# Patient Record
Sex: MALE | Race: WHITE | NOT HISPANIC OR LATINO | Employment: FULL TIME | URBAN - METROPOLITAN AREA
[De-identification: names, ages, dates, MRNs, and addresses within clinical notes are randomized per-mention and may not be internally consistent; named-entity substitution may affect disease eponyms.]

---

## 2019-05-28 ENCOUNTER — TRANSCRIBE ORDERS (OUTPATIENT)
Dept: ADMINISTRATIVE | Facility: HOSPITAL | Age: 39
End: 2019-05-28

## 2019-05-28 DIAGNOSIS — R06.83 SNORING: Primary | ICD-10-CM

## 2019-05-28 DIAGNOSIS — R06.81 APNEA: ICD-10-CM

## 2019-08-02 ENCOUNTER — OFFICE VISIT (OUTPATIENT)
Dept: SLEEP CENTER | Facility: CLINIC | Age: 39
End: 2019-08-02
Payer: COMMERCIAL

## 2019-08-02 VITALS
HEIGHT: 70 IN | HEART RATE: 74 BPM | WEIGHT: 228 LBS | DIASTOLIC BLOOD PRESSURE: 70 MMHG | SYSTOLIC BLOOD PRESSURE: 112 MMHG | BODY MASS INDEX: 32.64 KG/M2

## 2019-08-02 DIAGNOSIS — R06.83 SNORING: Primary | ICD-10-CM

## 2019-08-02 DIAGNOSIS — R06.81 APNEA: ICD-10-CM

## 2019-08-02 DIAGNOSIS — J34.2 DEVIATED NASAL SEPTUM: ICD-10-CM

## 2019-08-02 DIAGNOSIS — G47.19 EXCESSIVE DAYTIME SLEEPINESS: ICD-10-CM

## 2019-08-02 DIAGNOSIS — E66.9 OBESITY (BMI 30-39.9): ICD-10-CM

## 2019-08-02 PROCEDURE — 99244 OFF/OP CNSLTJ NEW/EST MOD 40: CPT | Performed by: INTERNAL MEDICINE

## 2019-08-02 NOTE — PROGRESS NOTES
Consultation - [id number]  [facility]  45 y o  male  MKM:4/15/0298  DCZ:[id number]    Physician Requesting Consult: Mando Gardner DO     Reason for Consult : At your kind request I saw this patient for initial sleep evaluation today  The patient is here to evaluate for suspected Obstructive Sleep Apnea  PFSH, Problem List, Medications & Allergies were reviewed in EMR  He  has a past medical history of Psychiatric disorder  He has a current medication list which includes the following prescription(s): cephalexin and lamotrigine  HPI:  He has been snoring for decades  Snoring has gotten much worse and disturbs his wife was also witnessed apneas  Aware of snoring or breathing difficulties during sleep  Snoring is somewhat improved when he uses breathe right strips  Other Complaints: none  Restless Leg Syndrome: reports no suggestive symptoms but has episodic leg cramps  Parasomnia: no features reported   Sleep Routine:   Typical Bedtime:  10:30 p m  Gets OOB:  4:45 a m  TIB:6 5 hrs  Sleep latency:< 15 minutes Sleep Interruptions:1-2 x/night because of nocturia and is able to fall back asleep  Awakens: spontaneously  Upon awakening:usually feels refreshed He estimates getting 5-7 hrs sleep  He has Excessive Daytime Sleepiness takes power naps and dozes of unintentionally when sedentary  [place] Sleepiness Scale rated at Total score: 22 /24  Habits: reports that he has never smoked  He has never used smokeless tobacco , reports that he does not drink alcohol ,  reports that he does not use drugs  ,Caffeine use:limited , Exercise routine: regular    Family History: Mother has obstructive sleep apnea  ROS: reviewed & as attached  Significant for 10 lb weight gain in the past 1 year  He has nasal stuffiness due to environmental allergies   Mood is stable and he is no longer needing Lamictal      EXAM:    Vitals /70   Pulse 74   Ht 5' 10" (1 778 m)   Wt 103 kg (228 lb)   BMI 32 71 kg/m²     General  Well groomed male; appears stated age; no apparent distress  Psychiatric   Speech:clear and coherent; Cooperative  Normal mood, affect & thought    Head   Craniofacial anatomy:slight overjet  Sinuses: non- tender  TMJ: Normal     Eyes   EOM's intact;  conjunctiva/corneas clear         Nasal Airway  is patent Septum:  Deviated, Mucous membranes:appear normal     Turbinates: normal ;  No Rhinorrhea; No PND  Oral   Airway  crowded Tongue:  ; Modified Mallampati class III (soft and hard palate and base of uvula visible)  Hard Palate:normal ;Soft Palate:  redundant soft palate  Tonsils: no hypertrophy  Teeth: normal       Neck  appears thick; Neck Circumference: 17 "; Supple; no abnormal masses; Thyroid:normal  Trachea:central      Lymph    No Cervical or Submandibular Lymhadenopathy   Heart:   S1,S2 normal;RRR; no gallop; nomurmurs     Lungs   Respiratory Effort:normal  Air entry good bilaterally  No wheezes  No rales   Abdomen   Obese, Soft & non-tender     Extremities   No pedal edema  No clubbing or cyanosis  Skin   Skin is warm and dry; Color& Hydration good; no facial rashes or lesions    Neurologic  Alert and orientated; CNII-XII intact; Motor normal; Sensation normal    Muscskeltl    Muscle bulk, tone and power WNL Gait:normal           IMPRESSION: Primary Sleep/Secondary(to Medical or Psych conditions) & comorbidities   1  Snoring  Ambulatory referral to Sleep Medicine    Diagnostic Sleep Study    CPAP Study   2  Apnea  Ambulatory referral to Sleep Medicine    Diagnostic Sleep Study    CPAP Study   3  Excessive daytime sleepiness  Diagnostic Sleep Study    CPAP Study   4  Deviated nasal septum     5  Obesity (BMI 30-39  9)          PLAN:   1  Comprehensive counseling was provided on pathophysiology, diagnostic strategies & treatment options; effects on symptoms and comorbidities; risks of inadequate therapy; costs and insurance aspects     2  I advised on weight reduction, avoiding sleeping supine, using alcohol or sedating medications close to bed time and on safe driving practices  3  Nocturnal polysomnography is indicated and a diagnostic study will be scheduled  4  Patient is interested in the mandibular advancement device but willing to try Positive airway pressure therapy and will be scheduled for a titration study if indicated  5  Nasal symptoms may [improve] with regular nasal saline rinse followed by topical nasal steroid if necessary  I also suggested trial of a nasal cone  6  Follow-up will be scheduled after the studies to review results, further details of treatment options and to initiate/adjust therapy  Thank you for allowing me to participate in the care of this patient  I will keep you apprised of developments         Sincerely,     Authenticated electronically by Ruchi Greenfield MD   on 47/49/76   Board Certified Specialist

## 2019-08-02 NOTE — PATIENT INSTRUCTIONS
What is OLGA? Obstructive sleep apnea is a common and serious sleep disorder that causes you to stop breathing during sleep  The airway repeatedly becomes blocked, limiting the amount of air that reaches your lungs  When this happens, you may snore loudly or making choking noises as you try to breathe  Your brain and body becomes oxygen deprived and you may wake up  This may happen a few times a night, or in more severe cases, several hundred times a night  Sleep apnea can make you wake up in the morning feeling tired or unrefreshed even though you have had a full night of sleep  During the day, you may feel fatigued, have difficulty concentrating or you may even unintentionally fall asleep  This is because your body is waking up numerous times throughout the night, even though you might not be conscious of each awakening  The lack of oxygen your body receives can have negative long-term consequences for your health  This includes:  High blood pressure  Heart disease  Irregular heart rhythms  Stroke  Pre-diabetes and diabetes  Depression    Testing  An objective evaluation of your sleep may be needed before your board certified sleep physician can make a diagnosis  Options include:   In-lab overnight sleep study  This type of sleep study requires you to stay overnight at a sleep center, in a bed that may resemble a hotel room  You will sleep with sensors hooked up to various parts of your body  These sensors record your brain waves, heartbeat, breathing and movement  An overnight sleep study also provides your doctor with the most complete information about your sleep  Learn more about an overnight sleep study      Home sleep apnea test  Some patients with high risk factors for obstructive sleep apnea and no other medical disorders may be candidates for a home sleep apnea test  The testing equipment differs in that it is less complicated than what is used in an overnight sleep study   As such, does not give all the data an in-lab will and if negative, may not mean you do not have the problem  Treatment for sleep apnea  includes using a continuous positive airway pressure (CPAP) machine to keep your airway open during sleep  A mask is placed over your nose and mouth, or just your nose  The mask is hooked to the CPAP machine that blows a gentle stream of air into the mask when you breathe  This helps keep your airway open so you can breathe more regularly  Extra oxygen may be given to you through the machine  You may be given a mouth device  It looks like a mouth guard or dental retainer and stops your tongue and mouth tissues from blocking your throat while you sleep  Surgery may be needed to remove extra tissues that block your mouth, throat, or nose  Manage sleep apnea:   Do not smoke  Nicotine and other chemicals in cigarettes and cigars can cause lung damage  Ask your healthcare provider for information if you currently smoke and need help to quit  E-cigarettes or smokeless tobacco still contain nicotine  Talk to your healthcare provider before you use these products  Do not drink alcohol or take sedative medicine before you go to sleep  Alcohol and sedatives can relax the muscles and tissues around your throat  This can block the airflow to your lungs  Maintain a healthy weight  Excess tissue around your throat may restrict your breathing  Ask your healthcare provider for information if you need to lose weight  Sleep on your side or use pillows designed to prevent sleep apnea  This prevents your tongue or other tissues from blocking your throat  You can also raise the head of your bed  Driving Safety  Refrain from driving when drowsy  Follow up with your healthcare provider as directed:  Write down your questions so you remember to ask them during your visits  Go to AASM website for more information: Sleepeducation  org     What is OLGA?    Obstructive sleep apnea is a common and serious sleep disorder that causes you to stop breathing during sleep  The airway repeatedly becomes blocked, limiting the amount of air that reaches your lungs  When this happens, you may snore loudly or making choking noises as you try to breathe  Your brain and body becomes oxygen deprived and you may wake up  This may happen a few times a night, or in more severe cases, several hundred times a night  Sleep apnea can make you wake up in the morning feeling tired or unrefreshed even though you have had a full night of sleep  During the day, you may feel fatigued, have difficulty concentrating or you may even unintentionally fall asleep  This is because your body is waking up numerous times throughout the night, even though you might not be conscious of each awakening  The lack of oxygen your body receives can have negative long-term consequences for your health  This includes:  High blood pressure  Heart disease  Irregular heart rhythms  Stroke  Pre-diabetes and diabetes  Depression    Testing  An objective evaluation of your sleep may be needed before your board certified sleep physician can make a diagnosis  Options include:   In-lab overnight sleep study  This type of sleep study requires you to stay overnight at a sleep center, in a bed that may resemble a hotel room  You will sleep with sensors hooked up to various parts of your body  These sensors record your brain waves, heartbeat, breathing and movement  An overnight sleep study also provides your doctor with the most complete information about your sleep  Learn more about an overnight sleep study      Home sleep apnea test  Some patients with high risk factors for obstructive sleep apnea and no other medical disorders may be candidates for a home sleep apnea test  The testing equipment differs in that it is less complicated than what is used in an overnight sleep study   As such, does not give all the data an in-lab will and if negative, may not mean you do not have the problem  Treatment for sleep apnea  includes using a continuous positive airway pressure (CPAP) machine to keep your airway open during sleep  A mask is placed over your nose and mouth, or just your nose  The mask is hooked to the CPAP machine that blows a gentle stream of air into the mask when you breathe  This helps keep your airway open so you can breathe more regularly  Extra oxygen may be given to you through the machine  You may be given a mouth device  It looks like a mouth guard or dental retainer and stops your tongue and mouth tissues from blocking your throat while you sleep  Surgery may be needed to remove extra tissues that block your mouth, throat, or nose  Manage sleep apnea:   Do not smoke  Nicotine and other chemicals in cigarettes and cigars can cause lung damage  Ask your healthcare provider for information if you currently smoke and need help to quit  E-cigarettes or smokeless tobacco still contain nicotine  Talk to your healthcare provider before you use these products  Do not drink alcohol or take sedative medicine before you go to sleep  Alcohol and sedatives can relax the muscles and tissues around your throat  This can block the airflow to your lungs  Maintain a healthy weight  Excess tissue around your throat may restrict your breathing  Ask your healthcare provider for information if you need to lose weight  Sleep on your side or use pillows designed to prevent sleep apnea  This prevents your tongue or other tissues from blocking your throat  You can also raise the head of your bed  Driving Safety  Refrain from driving when drowsy  Follow up with your healthcare provider as directed:  Write down your questions so you remember to ask them during your visits  Go to AAS website for more information: Sleepeducation  org

## 2019-10-16 ENCOUNTER — HOSPITAL ENCOUNTER (OUTPATIENT)
Dept: SLEEP CENTER | Facility: HOSPITAL | Age: 39
Discharge: HOME | End: 2019-10-16
Attending: INTERNAL MEDICINE
Payer: COMMERCIAL

## 2019-10-16 DIAGNOSIS — R06.81 APNEA: ICD-10-CM

## 2019-10-16 DIAGNOSIS — R06.83 SNORING: ICD-10-CM

## 2019-10-16 DIAGNOSIS — G47.19 EXCESSIVE DAYTIME SLEEPINESS: ICD-10-CM

## 2019-10-16 PROCEDURE — 95810 POLYSOM 6/> YRS 4/> PARAM: CPT

## 2019-10-17 NOTE — PROGRESS NOTES
Sleep Study Documentation    Pre-Sleep Study       Sleep testing procedure explained to patient:NO    Patient napped prior to study:NO    Caffeine:Dayshift worker after 12PM   Caffeine use:NO    Alcohol:Dayshift workers after 5PM: Alcohol use:NO    Typical day for patient:YES       Study Documentation    Sleep Study Indications: Snoring, BMI > 30, EDS, and witnessed apneas    Sleep Study: Diagnostic   Snore:Severe  Supplemental O2: no    O2 flow rate (L/min) range N/A  O2 flow rate (L/min) final N/A  Minimum SaO2 73%  Baseline SaO2 97%        EKG abnormalities: no     EEG abnormalities: no    Sleep Study Recorded < 2 hours: N/A    Sleep Study Recorded > 2 hours but incomplete study: N/A    Sleep Study Recorded 6 hours but no sleep obtained: NO    Patient classification: employed       Patient waited longer than usual to be set-up due another patient having difficulty tolerating CPAP during acclimation sessions resulting in CPAP failure  Patient kept asking for results prior to leaving the lab but he was informed that it's against our policy to give results to patients  Post-Sleep Study    Medication used at bedtime or during sleep study:NO    Patient reports time it took to fall asleep:less than 20 minutes    Patient reports waking up during study:1 to 2 times  Patient reports returning to sleep without difficulty  Patient reports sleeping 4 to 6 hours without dreaming  Patient reports sleep during study:typical    Patient rated sleepiness: Not sleepy or tired    PAP treatment:no

## 2019-10-18 ENCOUNTER — TELEPHONE (OUTPATIENT)
Dept: SLEEP CENTER | Facility: CLINIC | Age: 39
End: 2019-10-18

## 2019-10-18 NOTE — TELEPHONE ENCOUNTER
Left message for patient to call office  Sleep study reveals mild OLGA, was going to overbook on 10/29/19 in [place] for patient to discuss further with DR Rosario  Patient does have titration study scheduled for 11/6/19

## 2019-10-22 NOTE — TELEPHONE ENCOUNTER
Spoke with patient, advised above  Scheduled follow up with Dr Joelle Sears 10/29/19 at [phone] in [facility] to discuss further

## 2019-10-25 ENCOUNTER — TRANSCRIBE ORDERS (OUTPATIENT)
Dept: SLEEP CENTER | Facility: HOSPITAL | Age: 39
End: 2019-10-25

## 2019-10-25 DIAGNOSIS — R06.81 APNEA, NOT ELSEWHERE CLASSIFIED: Primary | ICD-10-CM

## 2019-10-25 DIAGNOSIS — R06.83 SNORING: ICD-10-CM

## 2019-10-25 DIAGNOSIS — E66.9 OBESITY, UNSPECIFIED: ICD-10-CM

## 2019-10-25 DIAGNOSIS — G47.19 OTHER HYPERSOMNIA: ICD-10-CM

## 2019-10-29 ENCOUNTER — OFFICE VISIT (OUTPATIENT)
Dept: SLEEP CENTER | Facility: HOSPITAL | Age: 39
End: 2019-10-29
Payer: COMMERCIAL

## 2019-10-29 VITALS
BODY MASS INDEX: 34.36 KG/M2 | SYSTOLIC BLOOD PRESSURE: 108 MMHG | HEIGHT: 70 IN | WEIGHT: 240 LBS | DIASTOLIC BLOOD PRESSURE: 70 MMHG

## 2019-10-29 DIAGNOSIS — G47.19 OTHER HYPERSOMNIA: ICD-10-CM

## 2019-10-29 DIAGNOSIS — G47.33 OBSTRUCTIVE SLEEP APNEA: Primary | ICD-10-CM

## 2019-10-29 DIAGNOSIS — J34.2 DEVIATED NASAL SEPTUM: ICD-10-CM

## 2019-10-29 DIAGNOSIS — E66.9 OBESITY, UNSPECIFIED: ICD-10-CM

## 2019-10-29 PROCEDURE — 99214 OFFICE O/P EST MOD 30 MIN: CPT | Performed by: INTERNAL MEDICINE

## 2019-10-29 NOTE — PROGRESS NOTES
Follow-up Note - Sleep Center   Jayleen Shen  44 y o  male  :1980  HOK:[id number]    I saw Yuly Bustos in sleep clinic today for his sleep complaints & comorbidities  Patient recently had a diagnostic sleep study and is here (with his wife) to review results / treatment options  The study demonstrated Mild obstructive sleep apnea: AHI is 7 7 /hour , higher while supine and considerably higher during stage REM at 18 per hour so in the dreams sleep  Intermittent snoring of loud intensity was noted  Minimum oxygen saturation 73%  and then 9 3 minute of total sleep time was spent with saturations less than 90%  Sleep latency was short at 0 1 minutes  Sleep efficiency was better than normal at 94 3%    PFSH, Problem List, Medications & Allergies were reviewed in EMR  Interval changes: none reported  He  has a past medical history of Psychiatric disorder  He has a current medication list which includes the following prescription(s): cephalexin and lamotrigine  ROS: reviewed see attached  Significant for nasal congestion and frequent cough  HPI:  He has ongoing symptoms as outlined in his initial report  Sleep Routine: No interval changes:  He is getting around 7 hours sleep and has no difficulty initiating or maintaining sleep  He awakens spontaneously feeling rested but is tired as the day progresses  He has excessive drowsiness and rated himself at Total score: 19 /24 on the [place] sleepiness scale  Habits:  reports that he has never smoked  He has never used smokeless tobacco  He reports that he does not drink alcohol or use drugs  EXAM: /70   Ht 5' 10" (1 778 m)   Wt 109 kg (240 lb)   BMI 34 44 kg/m²     Patient is well groomed; well appearing  Skin/Extrem: warm & dry; col & hydration normal; no edema  Psych: cooperativeand in no distress  Mental state appears normal   CNS: Alert, orientated, clear & coherent speech  H&N: EOMI; NC/AT:no facial pressure marks, no rashes  ENMT Mucosa appearsnormal Nasal airway:patent  Oral airway:  crowded  Resp:effort is normal CVS: RRR ABD: truncal obesity MSK:Gait normal     IMPRESSION: Primary Sleep/Secondary(to Medical or Psych conditions) & comorbidities   1  Obstructive sleep apnea  Ambulatory referral to Sleep Medicine   2  Other hypersomnia  Ambulatory referral to Sleep Medicine   3  Deviated nasal septum     4  Obesity, unspecified  Ambulatory referral to Sleep Medicine       PLAN:    1  I reviewed results of the Sleep studies with the patient  2  With respect to above conditions, I counseled on pathophysiology, diagnosis, treatment options, risks and benefits; inter-relationship and effects on symptoms and comorbidities; risks of no treatment; costs and insurance aspects  3  Patient elected positive airway pressure therapy and has been scheduled for a titration study  4  I also advised weight reduction and allowing sufficient opportunity for sleep  5  Nasal symptoms may improve with regular nasal saline rinse followed by topical nasal steroid if necessary  6  Follow-up to be scheduled in 2 months after set up, to monitor compliance, progress and to adjust therapy  Thank you for allowing me to participate in the care of this patient  I will keep you apprised of developments      Sincerely,    Authenticated electronically by Jami Christie MD on 41/93/69   Board Certified Specialist

## 2019-10-29 NOTE — PATIENT INSTRUCTIONS

## 2019-10-29 NOTE — PROGRESS NOTES
Review of Systems      Genitourinary none   Cardiology none   Gastrointestinal none   Neurology none   Constitutional none   Integumentary none   Psychiatry none   Musculoskeletal none   Pulmonary frequent cough and snoring   ENT none   Endocrine none   Hematological none

## 2019-11-04 DIAGNOSIS — G47.33 OSA (OBSTRUCTIVE SLEEP APNEA): Primary | ICD-10-CM

## 2020-02-26 ENCOUNTER — TRANSCRIBE ORDERS (OUTPATIENT)
Dept: SLEEP CENTER | Facility: CLINIC | Age: 40
End: 2020-02-26

## 2020-02-26 DIAGNOSIS — G47.33 OBSTRUCTIVE SLEEP APNEA (ADULT) (PEDIATRIC): Primary | ICD-10-CM

## 2020-03-03 ENCOUNTER — OFFICE VISIT (OUTPATIENT)
Dept: SLEEP CENTER | Facility: HOSPITAL | Age: 40
End: 2020-03-03
Payer: COMMERCIAL

## 2020-03-03 VITALS
SYSTOLIC BLOOD PRESSURE: 100 MMHG | DIASTOLIC BLOOD PRESSURE: 60 MMHG | BODY MASS INDEX: 33.79 KG/M2 | WEIGHT: 236 LBS | HEIGHT: 70 IN | HEART RATE: 73 BPM

## 2020-03-03 DIAGNOSIS — F51.12 INSUFFICIENT SLEEP SYNDROME: ICD-10-CM

## 2020-03-03 DIAGNOSIS — G47.33 OBSTRUCTIVE SLEEP APNEA (ADULT) (PEDIATRIC): Primary | ICD-10-CM

## 2020-03-03 DIAGNOSIS — G47.19 OTHER HYPERSOMNIA: ICD-10-CM

## 2020-03-03 DIAGNOSIS — R68.2 DRY MOUTH: ICD-10-CM

## 2020-03-03 DIAGNOSIS — J34.2 DEVIATED NASAL SEPTUM: ICD-10-CM

## 2020-03-03 PROCEDURE — 99214 OFFICE O/P EST MOD 30 MIN: CPT | Performed by: INTERNAL MEDICINE

## 2020-03-03 NOTE — PROGRESS NOTES
Review of Systems      Genitourinary none   Cardiology none   Gastrointestinal none   Neurology none   Constitutional none   Integumentary none   Psychiatry none   Musculoskeletal none   Pulmonary wheezing and snoring   ENT none   Endocrine none   Hematological none

## 2020-03-03 NOTE — PATIENT INSTRUCTIONS

## 2020-03-03 NOTE — PROGRESS NOTES
Follow-Up Note - Sleep Center   Reno Mcmahon  44 y o  male  :1980  UIL:[id number]    CC: I saw this patient for follow-up in clinic today for his Sleep Disordered Breathing, Coexisting Sleep and Medical Problems  A diagnostic study 2019 demonstrated Mild obstructive sleep apnea: AHI is 7 7 /hour , higher while supine and considerably higher during stage REM at 18 per hour so in the dreams sleep  Intermittent snoring of loud intensity was noted  Minimum oxygen saturation 73% and then 9 3 minute of total sleep time was spent with saturations less than 90%  Sleep latency was short at 0 1 minutes  Sleep efficiency was better than normal at 94 3%  Auto titrating Pap was initiated  PFSH, Problem List, Medications & Allergies were reviewed in EMR  Interval changes: none reported  He  has a past medical history of Psychiatric disorder  He has a current medication list which includes the following prescription(s): cephalexin and lamotrigine  ROS: A 10 point review of systems undertaken (as attached)  He reported no nasal, respiratory or cardiac symptoms  DATA REVIEWED:  using PAP > 4 hours/night 100% of the time  Estimated BRITNI 8 6/hour at pressure of 11 2cm H2O @90th percentile  SUBJECTIVE: Regarding use of PAP, [employer] reports:   · He is experiencing some adverse effects: dry mouth/throat and mask leaks   · He is unsure whether benefiting from use:    Sleep Routine: He reports getting 6-7 hrs sleep  ; he has no difficulty initiating or maintaining sleep   He awakens spontaneously and is not always refreshed  He somewhat improved excessive drowsiness but would still nap given the opportunity  He rated himhimelf at Total score: 15 /24 on the [place] sleepiness scale  Habits: reports that he has never smoked  He has never used smokeless tobacco ], ,  reports that he does not use drugs  , Caffeine use: very little , Execise routine: none but is pyically active in his job  OBJECTIVE: /60   Pulse 73   Ht 5' 10" (1 778 m)   Wt 107 kg (236 lb)   BMI 33 86 kg/m²    Constitutional: Patient is well groomed; well appearing  Skin/Extrem: warm & dry; col & hydration normal; no edema  Psych: cooperaivean i n distress     CNS: Alert, orientated, clear& coherent speech  H&N: EOMI; NC/AT:no facial pressure marks, no rashes  ENMT Mucus membranes nomal]  Nasal airway:paten  Oral airway: crowded  Resp:efort i normal CVS: RR ABD:[runcal obesity MSK:Gai normal     ASSESSMENT: Primary Slee/Secondary(to Mdical or Pych coditions) & comorbidities   1  Obstructive sleep apnea (adult) (pediatric)  Ambulatory referral to Sleep Medicine    PAP DME Pressure Change    PAP DME Resupply/Reorder   2  Other hypersomnia     3  Insufficient sleep syndrome     4  Dry mouth     5  Deviated nasal septum       PLAN:  1  Treatment with  PAP is medically necessary and Ramond Model is agreable to continue use  2  Care of equipment, methods to improve comfort using PAP and importance of compliance with therapy were discussed  3  Pressure setting: change 8-12 cmH2O     4  Rx provided to replace supplies and Care coordinated with DME provider  5  Strategies for weight reduction were discussed  6  Folow-upi advised in 2 months to monitor progress, compliance and to adjust therapy  If daytime drowsiness persists in spite of the above strategies, he may warrant further evaluation  Thank you for allowing me to participate in the care of this patient      Sincerely,    Authenticated electronically by Elan Wallace MD on 96/10/54  Board Certified Specialist

## 2020-03-04 ENCOUNTER — TELEPHONE (OUTPATIENT)
Dept: SLEEP CENTER | Facility: CLINIC | Age: 40
End: 2020-03-04

## 2020-03-04 NOTE — TELEPHONE ENCOUNTER
Received a pressure change  Changed accordingly  Patient's now set to an APAP 8-12cm  Called patient and left a message

## 2020-03-04 NOTE — TELEPHONE ENCOUNTER
Resupply order faxed to [facility]  Pressure change order given to [facility] liaison

## 2020-05-29 ENCOUNTER — TRANSCRIBE ORDERS (OUTPATIENT)
Dept: SLEEP CENTER | Facility: HOSPITAL | Age: 40
End: 2020-05-29

## 2020-05-29 DIAGNOSIS — F51.12 INSUFFICIENT SLEEP SYNDROME: ICD-10-CM

## 2020-05-29 DIAGNOSIS — G47.33 OBSTRUCTIVE SLEEP APNEA (ADULT) (PEDIATRIC): Primary | ICD-10-CM

## 2020-05-29 DIAGNOSIS — G47.19 OTHER HYPERSOMNIA: ICD-10-CM

## 2020-06-02 ENCOUNTER — OFFICE VISIT (OUTPATIENT)
Dept: SLEEP CENTER | Facility: HOSPITAL | Age: 40
End: 2020-06-02
Payer: COMMERCIAL

## 2020-06-02 VITALS
DIASTOLIC BLOOD PRESSURE: 58 MMHG | HEIGHT: 70 IN | HEART RATE: 56 BPM | BODY MASS INDEX: 31.5 KG/M2 | WEIGHT: 220 LBS | SYSTOLIC BLOOD PRESSURE: 108 MMHG

## 2020-06-02 DIAGNOSIS — J34.2 DEVIATED NASAL SEPTUM: ICD-10-CM

## 2020-06-02 DIAGNOSIS — R68.2 DRY MOUTH: ICD-10-CM

## 2020-06-02 DIAGNOSIS — E66.9 OBESITY (BMI 30-39.9): ICD-10-CM

## 2020-06-02 DIAGNOSIS — G47.33 OBSTRUCTIVE SLEEP APNEA (ADULT) (PEDIATRIC): Primary | ICD-10-CM

## 2020-06-02 DIAGNOSIS — G47.19 OTHER HYPERSOMNIA: ICD-10-CM

## 2020-06-02 DIAGNOSIS — F51.12 INSUFFICIENT SLEEP SYNDROME: ICD-10-CM

## 2020-06-02 PROCEDURE — 99214 OFFICE O/P EST MOD 30 MIN: CPT | Performed by: INTERNAL MEDICINE

## 2020-06-03 ENCOUNTER — TELEPHONE (OUTPATIENT)
Dept: SLEEP CENTER | Facility: CLINIC | Age: 40
End: 2020-06-03

## 2021-06-02 ENCOUNTER — TRANSCRIBE ORDERS (OUTPATIENT)
Dept: SLEEP CENTER | Facility: HOSPITAL | Age: 41
End: 2021-06-02

## 2021-06-02 DIAGNOSIS — G47.33 OBSTRUCTIVE SLEEP APNEA (ADULT) (PEDIATRIC): Primary | ICD-10-CM

## 2021-06-02 DIAGNOSIS — E66.9 OBESITY, UNSPECIFIED: ICD-10-CM

## 2021-06-02 DIAGNOSIS — G47.19 OTHER HYPERSOMNIA: ICD-10-CM

## 2021-06-02 DIAGNOSIS — F51.12 INSUFFICIENT SLEEP SYNDROME: ICD-10-CM

## 2021-06-08 ENCOUNTER — OFFICE VISIT (OUTPATIENT)
Dept: SLEEP CENTER | Facility: HOSPITAL | Age: 41
End: 2021-06-08
Payer: COMMERCIAL

## 2021-06-08 VITALS
BODY MASS INDEX: 31.5 KG/M2 | HEIGHT: 70 IN | SYSTOLIC BLOOD PRESSURE: 120 MMHG | WEIGHT: 220 LBS | DIASTOLIC BLOOD PRESSURE: 80 MMHG

## 2021-06-08 DIAGNOSIS — J34.2 DEVIATED NASAL SEPTUM: ICD-10-CM

## 2021-06-08 DIAGNOSIS — E66.9 OBESITY (BMI 30-39.9): ICD-10-CM

## 2021-06-08 DIAGNOSIS — G47.33 OBSTRUCTIVE SLEEP APNEA (ADULT) (PEDIATRIC): Primary | ICD-10-CM

## 2021-06-08 DIAGNOSIS — R68.2 DRY MOUTH: ICD-10-CM

## 2021-06-08 DIAGNOSIS — F32.A DEPRESSION, UNSPECIFIED DEPRESSION TYPE: ICD-10-CM

## 2021-06-08 DIAGNOSIS — G47.19 OTHER HYPERSOMNIA: ICD-10-CM

## 2021-06-08 PROCEDURE — 99214 OFFICE O/P EST MOD 30 MIN: CPT | Performed by: INTERNAL MEDICINE

## 2021-06-08 RX ORDER — ARIPIPRAZOLE 2 MG/1
TABLET ORAL
COMMUNITY
Start: 2021-05-27

## 2021-06-08 RX ORDER — VENLAFAXINE HYDROCHLORIDE 150 MG/1
CAPSULE, EXTENDED RELEASE ORAL
COMMUNITY
Start: 2021-05-30

## 2021-06-08 NOTE — PROGRESS NOTES
Review of Systems      Genitourinary none   Cardiology none   Gastrointestinal none   Neurology difficulty with memory   Constitutional none   Integumentary none   Psychiatry none   Musculoskeletal none   Pulmonary none   ENT none   Endocrine none   Hematological none

## 2021-06-08 NOTE — PATIENT INSTRUCTIONS

## 2021-06-08 NOTE — PROGRESS NOTES
Follow-Up Note - Sleep Center   Ashley Salazar  36 y o  male  :1980  UUB:[id number]    CC: I saw this patient for follow-up in clinic today for Sleep disordered breathing, Coexisting Sleep and Medical Problems  A diagnostic study 2019 demonstrated Mild obstructive sleep apnea: AHI is 7 7 /hour , higher while supine and considerably higher during stage REM at 18 per hour so in the dreams sleep  Intermittent snoring of loud intensity was noted  Minimum oxygen saturation 73% and then 9 3 minute of total sleep time was spent with saturations less than 90%  Sleep latency was short at 0 1 minutes  Sleep efficiency was better than normal at 94 3%  Auto titrating Pap was initiated  PFSH, Problem List, Medications & Allergies were reviewed in EMR  Interval changes: none reported  He  has a past medical history of Psychiatric disorder  He has a current medication list which includes the following prescription(s): aripiprazole, venlafaxine, cephalexin, and lamotrigine  PHYSIOLOGICAL DATA REVIEW AND INTERPRETATION:   using PAP > 4 hours/night 60%  Estimated BRITNI 2 5/hour with pressure of 11cm H2O @90th percentile  Compliance: fair; Sleep disordered breathing:stable & within target range    SUBJECTIVE: Regarding use of PAP, Leanne Keating reports:   · He is experiencing no  adverse effects:  Dry mouth has improved with use of humidity  · He is benefiting from use: Unsure   Sleep Routine: Leanne Keating reports getting 7-8 hrs sleep  ; he has no difficulty initiating or maintaining sleep   He arises spontaneously and is not always refreshed  Leanne Keating reports Excessive Daytime Sleepiness  he  feels like napping but does not have the opportunity and rated himself at Total score: 15 /24 on the Cleves Sleepiness Scale  Habits: reports that he has never smoked   He has never used smokeless tobacco ,  reports no history of alcohol use ,  reports no history of drug use , Caffeine use: very little , Exercise routine: regular is physically active in his job  ROS: as attached  Significant for weight has been stable  Nasal symptoms are controlled  He reported no respiratory or cardiac symptoms  He was recently diagnosed with depression and was started on Effexor and Abilify that he takes in the morning  EXAM: /80   Ht 5' 10" (1 778 m)   Wt 99 8 kg (220 lb)   BMI 31 57 kg/m²     Patient is well groomed; well appearing  H&N: EOMI; NC/AT:no facial pressure marks, no rashes  Skin/Extrem: col & hydration normal; no edema  Psych: cooperativeand in no distress  Mental state:  Has a constricted affect  Resp: Respiratory effort is normal  CNS: Alert, orientated, clear & coherent speech  Physical findings otherwise essentially unchanged from previous  IMPRESSION: Problem List Items & Comorbidities Addressed this Visit    1  Obstructive sleep apnea (adult) (pediatric)  Ambulatory referral to Sleep Medicine    PAP DME Resupply/Reorder   2  Other hypersomnia  Ambulatory referral to Sleep Medicine   3  Deviated nasal septum     4  Depression, unspecified depression type     5  Dry mouth     6  Obesity (BMI 30-39  9)         PLAN:  1  I reviewed results of prior studies and physiologic data with the patient  I discussed treatment options with risks and benefits  2  Treatment with  PAP is medically necessary and Tosha Devries is agreable to continue use  3  Care of equipment, methods to improve comfort using PAP and importance of compliance with therapy were discussed  4  Pressure setting: continue 8-12 cmH2O     5  Rx provided to replace  supplies and Care coordinated with DME provider  6  I advise taking Abilify q h s  And if symptoms persist to also take effect so at night  He is under care of Psychiatry and may need reviewing of his medications  7  Discussed strategies for weight reduction  8  Follow-up is advised in 1 year or sooner if needed to monitor progress, compliance and to adjust therapy  Thank you for allowing me to participate in the care of this patient      Sincerely,    Authenticated electronically by Bryon West MD on 33/77/78   Board Certified Specialist

## 2021-06-09 ENCOUNTER — TELEPHONE (OUTPATIENT)
Dept: SLEEP CENTER | Facility: CLINIC | Age: 41
End: 2021-06-09

## 2022-06-21 ENCOUNTER — OFFICE VISIT (OUTPATIENT)
Dept: SLEEP CENTER | Facility: HOSPITAL | Age: 42
End: 2022-06-21
Payer: COMMERCIAL

## 2022-06-21 VITALS
OXYGEN SATURATION: 98 % | HEART RATE: 84 BPM | BODY MASS INDEX: 35.22 KG/M2 | HEIGHT: 70 IN | WEIGHT: 246 LBS | SYSTOLIC BLOOD PRESSURE: 120 MMHG | DIASTOLIC BLOOD PRESSURE: 78 MMHG

## 2022-06-21 DIAGNOSIS — J34.2 DEVIATED NASAL SEPTUM: ICD-10-CM

## 2022-06-21 DIAGNOSIS — G47.33 OBSTRUCTIVE SLEEP APNEA (ADULT) (PEDIATRIC): Primary | ICD-10-CM

## 2022-06-21 DIAGNOSIS — F32.A DEPRESSION, UNSPECIFIED DEPRESSION TYPE: ICD-10-CM

## 2022-06-21 DIAGNOSIS — R68.2 DRY MOUTH: ICD-10-CM

## 2022-06-21 DIAGNOSIS — Z71.89 CPAP USE COUNSELING: ICD-10-CM

## 2022-06-21 DIAGNOSIS — F51.12 INSUFFICIENT SLEEP SYNDROME: ICD-10-CM

## 2022-06-21 DIAGNOSIS — E66.9 OBESITY, UNSPECIFIED: ICD-10-CM

## 2022-06-21 DIAGNOSIS — G47.19 OTHER HYPERSOMNIA: ICD-10-CM

## 2022-06-21 PROCEDURE — 99214 OFFICE O/P EST MOD 30 MIN: CPT | Performed by: INTERNAL MEDICINE

## 2022-06-21 NOTE — PATIENT INSTRUCTIONS
Continuous Positive Airway Pressure (CPAP) therapy was prescribed to you as a medical necessity and there are risks of discontinuing  use of the device, some of which may be long term  Symptoms you experienced before using CPAP may return such as snoring, apneas, excessive daytime sleepiness, hypertension, cardiac arrhythmias, risk of stroke, congestive heart-failure, exacerbation of COPD and potential respiratory failure  Ultimately, it is a personal decision for you to make if you continue use of an affected device or discontinue until a replacement is provided  Unfortunately, [employer] has provided us with limited information about available devices  However, recently some patients who registered the machines early on, already received replacement  You can visit their website at www  [email or web address]-update to register your device or www  [email or web address]  expertinquiry  com to learn more about how [employer] to replace your device  You can also try calling [phone]  Another option would be to check with your medical equipment provider to determine if you are eligible for a new machine through your insurance, if not you can pay out of pocket for a new machine  According to [facility], an in line bacterial filter is not recommended for use with CPAP/BiPAP  If you wish to get a replacement machine, we are able to provide you with a script  Please include your mask type  On 14 Jun 2021, [facility] announced a recall of most of the CPAP machines that it has made in the past decade  The recall notice stated that their concern centered on sound-deadening foam used in all the machines  What should an ordinary CPAP user do? You are being forced to make a personal decision, and you have very little hard data at your disposal   The epidemiologic studies cited above make it appear that [facility] is being overly cautious  However, you will have to live with the consequences of your decision    If you get in your car, you know that you could die in a fiery crash, or become paralyzed in a collision, but you drive on, because you understand that the odds of a bad outcome are acceptably small  Here you are being asked to make a similar decision  The chances that the foam in your CPAP machine will harm you irreparably is certainly small  Is it acceptably small? Only you can decide  The following was written by a physician CPAP user who decided for himself that there is minimal if any carcinogenic risk  On June 14 [facility] issued a recall for almost all their CPAP machines in the [address] [facility], approximately 2 million devices  For the rest of the world, they did not issue a recall, but rather a "safety advisory" for the millions of other CPAP machines  Their stated reason was the "difference in regulatory environments" between the [place] and the rest of the world, but what it really means is that they are concerned with the high risk of litigation in the [address] [facility]  This is much less of an issue in the rest of the world  Because of this concern, the information from [employer] to providers and patients is probably limited by their legal considerations  What did they find? Estephanie Emmanuel has two concerns  First, they found that, occasionally, in some unknown number of machines, the sound insulating foam that keeps the machine quiet can deteriorate  Tiny particles of foam could get into the airstream  Particles could, in some cases, irritate the airway and possibly cause coughing, wheezing and other allergic symptoms  This may be a more significant issue for patients with underlying lung disease, such as asthma or emphysema  As far as anyone knows, these foam breakdown particles are generally inert and similar to ordinary dust  [employer] has not released any data revealing long-term risks  Is this common? They haven't told us how often this occurs; whether it's 1/100 machines, 1/1,000 machines or 1/100,0000   We believe that [facility]' perspective is that the recall is necessary to protect them from litigation, even if the risk is very small (or nonexistent)  The second concern is the foam may release trace amounts of several chemicals connected to the manufacture of the foam  These chemicals are in widespread use in industry, and in fact, appear in small quantities in many consumer products, cosmetics, personal care products, and medicines  Based on studies done in the laboratory where bacteria are exposed to high concentrations of these chemicals, there is a concern that one or more may be carcinogenic at some dose  It is important to note that these screening tests do not correlate closely with human disease  At this time, there are no known human cancers associated with these substances  That does not mean they do not have the capability to contribute to cancers; we just don't know  Even if does, we don't know if it takes a month of exposure, a year, or a decade  Is it worse than smoking a pack of cigarettes for a year? Again, we just don't know  Even though the company just became aware of this issue, it has always been there, even if you've had your device for 2,3, or 4 or more years  In summary, we don't know how common foam breakdown occurs  If it does occur, we don't know how often it contributes to disease  We are not even sure that there is any meaningful risk at all  What can you do? In response to this information, patients have 4 choices, and we have had patients select each of these  We cannot give you advice, because there is not enough data to do that, but we can give you information to help you make a choice  The three choices are:    1  Keep using your CPAP until the recall is resolved, which could take up to a year, considering that the risk is unknown  Most of our patients with severe sleep apnea or who are very dependent on their machines have elected to do this   This includes most of our physician patients who are CPAP users  2  Stop using your device until the recall has been resolved  Some patients, especially those with mild to moderate sleep apnea, who may feel minimally different without their CPAPs have elected to stop using CPAP until the recall is resolved  Remember that most people have had symptoms of sleep apnea for years before the diagnosis was made  3  Purchase another brand of machine (such as Resmed) on your own with your own money  They are probably less expensive on the internet (about $900) than from your current equipment provider  If you decide on this, we can provide orders for you to use to purchase one  If you decide to stop using CPAP, you should monitor your symptoms carefully and DO NOT DRIVE IF YOU ARE SLEEPY  If you continue to use your device, keep the humidity and heat low or off  Do not use ozone , such as SoClean  Observe the tubing frequently to make sure there are no black particles or debris  If you decide to purchase a PAP device on your own on the internet, let us know and we can provided a prescription to  order a replacement machine  4  Use another form of therapy e g  Dental appliance, surgery      This has been a difficult time for all of us and we are here to help you if we can  The following was written by a physician CPAP user who decided for himself that there is minimal if any carcinogenic risk  Nursing Support:  When: Monday through Friday 7A-5PM except holidays  Where: Our direct line is [phone]  If you are having a true emergency please call 911  In the event that the line is busy or it is after hours please leave a voice message and we will return your call  Please speak clearly, leaving your full name, birth date, best number to reach you and the reason for your call  Medication refills:  We will need the name of the medication, the dosage, the ordering provider, whether you get a 30 or 90 day refill, and the pharmacy name and address  Medications will be ordered by the provider only  Nurses cannot call in prescriptions  Please allow 7 days for medication refills  Physician requested updates: If your provider requested that you call with an update after starting medication, please be ready to provide us the medication and dosage, what time you take your medication, the time you attempt to fall asleep, time you fall asleep, when you wake up, and what time you get out of bed  Sleep Study Results: We will contact you with sleep study results and/or next steps after the physician has reviewed your testing

## 2022-06-22 ENCOUNTER — TELEPHONE (OUTPATIENT)
Dept: SLEEP CENTER | Facility: CLINIC | Age: 42
End: 2022-06-22